# Patient Record
Sex: FEMALE | Race: WHITE | URBAN - METROPOLITAN AREA
[De-identification: names, ages, dates, MRNs, and addresses within clinical notes are randomized per-mention and may not be internally consistent; named-entity substitution may affect disease eponyms.]

---

## 2017-04-28 ENCOUNTER — NEW PATIENT (OUTPATIENT)
Dept: URBAN - METROPOLITAN AREA CLINIC 56 | Facility: CLINIC | Age: 62
End: 2017-04-28
Payer: COMMERCIAL

## 2017-04-28 DIAGNOSIS — H25.13 AGE-RELATED NUCLEAR CATARACT, BILATERAL: ICD-10-CM

## 2017-04-28 PROCEDURE — 92004 COMPRE OPH EXAM NEW PT 1/>: CPT | Performed by: OPTOMETRIST

## 2017-04-28 PROCEDURE — 92015 DETERMINE REFRACTIVE STATE: CPT | Performed by: OPTOMETRIST

## 2017-04-28 ASSESSMENT — KERATOMETRY
OD: 44.06
OS: 43.92

## 2017-04-28 ASSESSMENT — INTRAOCULAR PRESSURE
OD: 14
OS: 14

## 2017-04-28 ASSESSMENT — VISUAL ACUITY
OD: 20/20
OS: 20/20

## 2019-03-29 ENCOUNTER — FOLLOW UP ESTABLISHED (OUTPATIENT)
Dept: URBAN - METROPOLITAN AREA CLINIC 56 | Facility: CLINIC | Age: 64
End: 2019-03-29
Payer: COMMERCIAL

## 2019-03-29 DIAGNOSIS — H31.011 MACULA SCARS OF POSTERIOR POLE (POST-TRAUMATIC), RIGHT EYE: ICD-10-CM

## 2019-03-29 PROCEDURE — 92015 DETERMINE REFRACTIVE STATE: CPT | Performed by: OPTOMETRIST

## 2019-03-29 PROCEDURE — 92014 COMPRE OPH EXAM EST PT 1/>: CPT | Performed by: OPTOMETRIST

## 2019-03-29 ASSESSMENT — INTRAOCULAR PRESSURE
OD: 16
OS: 17

## 2019-03-29 ASSESSMENT — VISUAL ACUITY
OS: 20/20
OD: 20/25

## 2019-03-29 ASSESSMENT — KERATOMETRY
OD: 44.09
OS: 43.66

## 2020-08-18 ENCOUNTER — FOLLOW UP ESTABLISHED (OUTPATIENT)
Dept: URBAN - METROPOLITAN AREA CLINIC 56 | Facility: CLINIC | Age: 65
End: 2020-08-18
Payer: COMMERCIAL

## 2020-08-18 DIAGNOSIS — H04.123 TEAR FILM INSUFFICIENCY OF BILATERAL LACRIMAL GLANDS: ICD-10-CM

## 2020-08-18 DIAGNOSIS — H52.4 PRESBYOPIA: ICD-10-CM

## 2020-08-18 PROCEDURE — 92014 COMPRE OPH EXAM EST PT 1/>: CPT | Performed by: OPTOMETRIST

## 2020-08-18 PROCEDURE — 92250 FUNDUS PHOTOGRAPHY W/I&R: CPT | Performed by: OPTOMETRIST

## 2020-08-18 ASSESSMENT — INTRAOCULAR PRESSURE
OD: 16
OS: 16

## 2020-08-18 ASSESSMENT — KERATOMETRY
OD: 44.13
OS: 44.03

## 2020-08-18 ASSESSMENT — VISUAL ACUITY
OS: 20/20
OD: 20/20

## 2023-06-28 ENCOUNTER — APPOINTMENT (RX ONLY)
Dept: URBAN - METROPOLITAN AREA CLINIC 158 | Facility: CLINIC | Age: 68
Setting detail: DERMATOLOGY
End: 2023-06-28

## 2023-06-28 DIAGNOSIS — L82.0 INFLAMED SEBORRHEIC KERATOSIS: ICD-10-CM

## 2023-06-28 PROCEDURE — ? COUNSELING

## 2023-06-28 PROCEDURE — 17110 DESTRUCTION B9 LES UP TO 14: CPT

## 2023-06-28 PROCEDURE — ? EDUCATIONAL RESOURCES PROVIDED

## 2023-06-28 PROCEDURE — ? LIQUID NITROGEN

## 2023-06-28 ASSESSMENT — LOCATION ZONE DERM
LOCATION ZONE: LEG
LOCATION ZONE: ARM

## 2023-06-28 ASSESSMENT — LOCATION SIMPLE DESCRIPTION DERM
LOCATION SIMPLE: RIGHT POSTERIOR UPPER ARM
LOCATION SIMPLE: LEFT PRETIBIAL REGION
LOCATION SIMPLE: LEFT THIGH
LOCATION SIMPLE: LEFT KNEE

## 2023-06-28 ASSESSMENT — LOCATION DETAILED DESCRIPTION DERM
LOCATION DETAILED: LEFT KNEE
LOCATION DETAILED: RIGHT DISTAL POSTERIOR UPPER ARM
LOCATION DETAILED: LEFT PROXIMAL PRETIBIAL REGION
LOCATION DETAILED: LEFT ANTERIOR DISTAL THIGH

## 2023-06-28 NOTE — PROCEDURE: LIQUID NITROGEN
Consent: The patient's consent was obtained including but not limited to risks of crusting, scabbing, blistering, scarring, darker or lighter pigmentary change, recurrence, incomplete removal and infection.
Add 52 Modifier (Optional): no
Show Applicator Variable?: Yes
Spray Paint Text: The liquid nitrogen was applied to the skin utilizing a spray paint frosting technique.
Duration Of Freeze Thaw-Cycle (Seconds): 5-10
Post-Care Instructions: I reviewed with the patient in detail post-care instructions. Patient is to wear sunprotection, and avoid picking at any of the treated lesions. Pt may apply Vaseline to crusted or scabbing areas.
Detail Level: Detailed
Medical Necessity Clause: This procedure was medically necessary because the lesions that were treated were:
Number Of Freeze-Thaw Cycles: 1 freeze-thaw cycle
Medical Necessity Information: It is in your best interest to select a reason for this procedure from the list below. All of these items fulfill various CMS LCD requirements except the new and changing color options.